# Patient Record
Sex: FEMALE | Race: WHITE | NOT HISPANIC OR LATINO | URBAN - METROPOLITAN AREA
[De-identification: names, ages, dates, MRNs, and addresses within clinical notes are randomized per-mention and may not be internally consistent; named-entity substitution may affect disease eponyms.]

---

## 2022-05-13 ENCOUNTER — EMERGENCY (EMERGENCY)
Facility: HOSPITAL | Age: 67
LOS: 1 days | Discharge: HOME | End: 2022-05-13
Attending: EMERGENCY MEDICINE
Payer: MEDICARE

## 2022-05-13 VITALS
DIASTOLIC BLOOD PRESSURE: 73 MMHG | OXYGEN SATURATION: 96 % | HEART RATE: 80 BPM | RESPIRATION RATE: 19 BRPM | HEIGHT: 67 IN | WEIGHT: 199.96 LBS | TEMPERATURE: 98 F | SYSTOLIC BLOOD PRESSURE: 154 MMHG

## 2022-05-13 PROCEDURE — 99284 EMERGENCY DEPT VISIT MOD MDM: CPT | Mod: 25

## 2022-05-13 PROCEDURE — 72125 CT NECK SPINE W/O DYE: CPT | Mod: 26,MA

## 2022-05-13 PROCEDURE — 72125 CT NECK SPINE W/O DYE: CPT | Mod: MA

## 2022-05-13 PROCEDURE — 29130 APPL FINGER SPLINT STATIC: CPT | Mod: RT

## 2022-05-13 PROCEDURE — 29125 APPL SHORT ARM SPLINT STATIC: CPT

## 2022-05-13 PROCEDURE — 73130 X-RAY EXAM OF HAND: CPT

## 2022-05-13 PROCEDURE — 70450 CT HEAD/BRAIN W/O DYE: CPT | Mod: 26,MA

## 2022-05-13 PROCEDURE — 70450 CT HEAD/BRAIN W/O DYE: CPT | Mod: MA

## 2022-05-13 PROCEDURE — 73130 X-RAY EXAM OF HAND: CPT | Mod: 26,50

## 2022-05-13 PROCEDURE — 70486 CT MAXILLOFACIAL W/O DYE: CPT | Mod: MA

## 2022-05-13 PROCEDURE — 73110 X-RAY EXAM OF WRIST: CPT | Mod: 26,50

## 2022-05-13 PROCEDURE — 70486 CT MAXILLOFACIAL W/O DYE: CPT | Mod: 26,MA

## 2022-05-13 PROCEDURE — 73110 X-RAY EXAM OF WRIST: CPT

## 2022-05-13 NOTE — ED PROVIDER NOTE - CLINICAL SUMMARY MEDICAL DECISION MAKING FREE TEXT BOX
Patient presents with fall and head injury.  VSS.  CT scan demonstrates no acute pathology. XR with right distal radius styloid fracture and thumb spica placed.  will f/u ortho.  Non toxic.  Well appearing. Uneventful ED observation period. Discussed signs and symptoms and reasons for return with good teachback. Discussed results and outcome of testing with the patient.  Patient advised to please follow up with their primary care doctor within the next 24 hours and return to the Emergency Department for worsening symptoms or any other concerns.  Patient advised that their doctor may call  to follow up on the specific results of the tests performed today in the emergency department.

## 2022-05-13 NOTE — ED PROVIDER NOTE - OBJECTIVE STATEMENT
Pertinent PMH/PSH/FHx/SHx and Review of Systems contained within:  Patient presents to the ED for fall with LOC.  This was a witnessed fall with witnessed brief LOC without loss of bowel/bladder.  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  No FHx of hypocoagulable state.  Patient was walking to her car with her sister when she tripped on uneven pavement.  Patient fell on outstretched hands and left face.  Had brief LOC estimated at 30 seconds by sister.  Now arrived via EMS without intervention PTA.  Non toxic.  Well appearing. No aggravating or relieving factors. No other pertinent PMH.  No other pertinent PSH.  No other pertinent FHx.  Patient denies EtOH/tobacco/illicit substance use. No fever/chills, No photophobia/eye pain/changes in vision, No ear pain/sore throat/dysphagia, No chest pain/palpitations, no SOB/cough/wheeze/stridor, No abdominal pain, No N/V/D, no dysuria/frequency/discharge, No neck/back pain, no rash, no changes in neurological status/function.

## 2022-05-13 NOTE — ED ADULT TRIAGE NOTE - CHIEF COMPLAINT QUOTE
Pt BIBA s/p witnessed mechanical trip and fall while walking to car.  +LOC.  Negative blood thinners.  Pt states once she regained consciousness she had brief episode of dizziness but denies on arrival.  Pt struck left side of face on fall; 3 abrasions noted to left side of face with swelling to side of left eye.  Pt c/o bilateral wrist pain; small abrasions, no bleeding noted to bilateral wrists; no obvious deformity.  GCS15

## 2022-05-13 NOTE — ED PROVIDER NOTE - PATIENT PORTAL LINK FT
You can access the FollowMyHealth Patient Portal offered by [facility] by registering at the following website: [email or web address]. By joining [facility]’s FollowMyHealth portal, you will also be able to view your health information using other applications (apps) compatible with our system.

## 2022-05-13 NOTE — ED ADULT NURSE NOTE - NSIMPLEMENTINTERV_GEN_ALL_ED
Implemented All Fall Risk Interventions:  [place] to call system. Call bell, personal items and telephone within reach. Instruct patient to call for assistance. Room bathroom lighting operational. Non-slip footwear when patient is off stretcher. Physically safe environment: no spills, clutter or unnecessary equipment. Stretcher in lowest position, wheels locked, appropriate side rails in place. Provide visual cue, wrist band, yellow gown, etc. Monitor gait and stability. Monitor for mental status changes and reorient to person, place, and time. Review medications for side effects contributing to fall risk. Reinforce activity limits and safety measures with patient and family.

## 2022-05-13 NOTE — ED PROVIDER NOTE - CARE PROVIDERS DIRECT ADDRESSES
,DirectAddress_Unknown,[email or web address]

## 2022-05-13 NOTE — ED PROVIDER NOTE - WR ORDER STATUS 1
Patient cannot contract for safety at this time. Patient was moved to room directly in front of nurse's station. Nursing office was contacted by phone and CO paperwork was faxed.       Shannen Hampton RN  09/13/19 [id number] Performed Resulted

## 2022-05-13 NOTE — ED PROVIDER NOTE - PHYSICAL EXAMINATION
Gen: Alert, NAD  Head: NC, AT, PERRL, EOMI, normal lids/conjunctiva  ENT: normal hearing, patent oropharynx without erythema/exudate, uvula midline  Neck: +supple, no tenderness/meningismus/JVD, +Trachea midline  Pulm: Bilateral BS, normal resp effort, no wheeze/stridor/retractions  CV: RRR, no R/G, +dist pulses  Abd: soft, NT/ND, +BS, no hepatosplenomegaly  Mskel: no edema/erythema/cyanosis, BUE with +NV intact and pain at 1st/2nd metacarpal/carpal on bilateral hands  Skin: no rash, abrasion left zygoma  Neuro: AAOx3, no gross sensory/motor deficits,

## 2022-05-13 NOTE — ED ADULT NURSE NOTE - NS ED NOTE  TALK SOMEONE YN
[Follow Up] : a follow up visit [Mother] : mother [Father] : father [FreeTextEntry1] : left supracondylar fracture No

## 2022-05-13 NOTE — ED PROVIDER NOTE - CARE PROVIDER_API CALL
Sebastian Hernandez (DO)  Orthopaedic Surgery  [address] [place] [address]  Phone: [phone]  Fax: [phone]  Follow Up Time:     Vinay Lamb; PhD)  Neurology; Vascular Neurology  [address] [facility], [facility] [address]  [place] [address]  Phone: [phone]  Fax: [phone]  Follow Up Time:

## 2024-02-11 NOTE — ED ADULT NURSE NOTE - OBJECTIVE STATEMENT
Pt Biba from home. Pt states she was walking to her sisters car and possibly tripped injuring her L face, L wrist, and L hand.  Pt unsure of what caused fall.
(4) excellent

## 2025-05-15 NOTE — ED PROVIDER NOTE - WR INTERPRETATION 2
[facility]                                                                    CHIEF COMPLAINT: Follow-up CT chest           HPI:   CT chest reviewed by me and noted below. Results were dicussed with patient and multiple good questions were answered.   Uses Symbicort BID - makes her cough a lot   Not using Spiriva   Smoking 12 ---> 9 cig/day       From prior visit:   Patient had PFTs 8/31/23 to evaluate for SOB. Reviewed by me and showed very severe COPD. Associated with SOB cough and wheezes. Better with IBD and worse without. Cough with yellow sputum. No hemoptysis. Uses Albuterol 3 times/day. Smoker 1/2 ppd- 47 pack year smoking.     Past Medical History:   Diagnosis Date    Abdominal aortic aneurysm (AAA) without rupture 9/13/2021    Infrarenal. 2.5cm incidental finding on CT scan 8/2021 10/13/22 US did not show aneurysm    Allergic rhinitis     Arthritis     Bell's palsy     Headache(784.0)     Hearing loss     Incontinence     urine    Migraine headache     PONV (postoperative nausea and vomiting)     Sinusitis chronic, sphenoidal 2012    noted on CT scan    Tinnitus        Past Surgical History:        Procedure Laterality Date    COLONOSCOPY      more than 10 years ago    COLONOSCOPY  9/8/2015    diverticulosis, hemorroids    DILATION AND CURETTAGE OF UTERUS      HAND SURGERY Right 10/02/2017    extensor/indius proprius to extensor pollicis tendono transfer, CTR, ring trigger finger    SINUS SURGERY         Allergies:  is allergic to augmentin [amoxicillin-pot clavulanate], magnevist [gadopentetate dimeglumine], sulfa antibiotics, and zyban [bupropion].  Social History:    TOBACCO:   reports that she has been smoking cigarettes. She started smoking about 52 years ago. She has a 52.2 pack-year smoking history. She has been exposed to tobacco smoke. She has never used smokeless tobacco.  ETOH:   reports no history of alcohol 
right distal radius fracture